# Patient Record
Sex: FEMALE | Race: WHITE | Employment: UNEMPLOYED | URBAN - METROPOLITAN AREA
[De-identification: names, ages, dates, MRNs, and addresses within clinical notes are randomized per-mention and may not be internally consistent; named-entity substitution may affect disease eponyms.]

---

## 2018-01-12 ENCOUNTER — HOSPITAL ENCOUNTER (EMERGENCY)
Facility: HOSPITAL | Age: 38
Discharge: HOME | End: 2018-01-12
Attending: EMERGENCY MEDICINE
Payer: COMMERCIAL

## 2018-01-12 ENCOUNTER — APPOINTMENT (OUTPATIENT)
Dept: ULTRASOUND IMAGING | Facility: HOSPITAL | Age: 38
End: 2018-01-12
Attending: EMERGENCY MEDICINE
Payer: COMMERCIAL

## 2018-01-12 VITALS
TEMPERATURE: 98 F | WEIGHT: 204 LBS | RESPIRATION RATE: 19 BRPM | OXYGEN SATURATION: 100 % | SYSTOLIC BLOOD PRESSURE: 110 MMHG | HEART RATE: 74 BPM | DIASTOLIC BLOOD PRESSURE: 67 MMHG | BODY MASS INDEX: 33.99 KG/M2 | HEIGHT: 65 IN

## 2018-01-12 DIAGNOSIS — O03.4 INEVITABLE ABORTION: ICD-10-CM

## 2018-01-12 DIAGNOSIS — N93.9 VAGINAL BLEEDING: Primary | ICD-10-CM

## 2018-01-12 LAB
ALBUMIN SERPL-MCNC: 3.8 G/DL (ref 3.5–4.8)
ALP LIVER SERPL-CCNC: 55 U/L (ref 37–98)
ALT SERPL-CCNC: 39 U/L (ref 14–54)
ANTIBODY SCREEN: NEGATIVE
AST SERPL-CCNC: 27 U/L (ref 15–41)
ATRIAL RATE: 88 BPM
BASOPHILS # BLD AUTO: 0.08 X10(3) UL (ref 0–0.1)
BASOPHILS NFR BLD AUTO: 0.5 %
BILIRUB SERPL-MCNC: 0.7 MG/DL (ref 0.1–2)
BUN BLD-MCNC: 9 MG/DL (ref 8–20)
CALCIUM BLD-MCNC: 8.9 MG/DL (ref 8.3–10.3)
CHLORIDE: 106 MMOL/L (ref 101–111)
CO2: 21 MMOL/L (ref 22–32)
CREAT BLD-MCNC: 1.1 MG/DL (ref 0.55–1.02)
EOSINOPHIL # BLD AUTO: 0.14 X10(3) UL (ref 0–0.3)
EOSINOPHIL NFR BLD AUTO: 0.9 %
ERYTHROCYTE [DISTWIDTH] IN BLOOD BY AUTOMATED COUNT: 12.6 % (ref 11.5–16)
GLUCOSE BLD-MCNC: 133 MG/DL (ref 70–99)
HCG QUANTITATIVE: ABNORMAL MIU/ML (ref ?–3)
HCT VFR BLD AUTO: 36.2 % (ref 34–50)
HGB BLD-MCNC: 12.7 G/DL (ref 12–16)
IMMATURE GRANULOCYTE COUNT: 0.07 X10(3) UL (ref 0–1)
IMMATURE GRANULOCYTE RATIO %: 0.5 %
LYMPHOCYTES # BLD AUTO: 2.41 X10(3) UL (ref 0.9–4)
LYMPHOCYTES NFR BLD AUTO: 16.1 %
M PROTEIN MFR SERPL ELPH: 7.1 G/DL (ref 6.1–8.3)
MCH RBC QN AUTO: 30.7 PG (ref 27–33.2)
MCHC RBC AUTO-ENTMCNC: 35.1 G/DL (ref 31–37)
MCV RBC AUTO: 87.4 FL (ref 81–100)
MONOCYTES # BLD AUTO: 0.57 X10(3) UL (ref 0.1–0.6)
MONOCYTES NFR BLD AUTO: 3.8 %
NEUTROPHIL ABS PRELIM: 11.72 X10 (3) UL (ref 1.3–6.7)
NEUTROPHILS # BLD AUTO: 11.72 X10(3) UL (ref 1.3–6.7)
NEUTROPHILS NFR BLD AUTO: 78.2 %
P AXIS: 58 DEGREES
P-R INTERVAL: 132 MS
PLATELET # BLD AUTO: 218 10(3)UL (ref 150–450)
POTASSIUM SERPL-SCNC: 3.6 MMOL/L (ref 3.6–5.1)
Q-T INTERVAL: 354 MS
QRS DURATION: 80 MS
QTC CALCULATION (BEZET): 428 MS
R AXIS: 42 DEGREES
RBC # BLD AUTO: 4.14 X10(6)UL (ref 3.8–5.1)
RED CELL DISTRIBUTION WIDTH-SD: 40.4 FL (ref 35.1–46.3)
RH BLOOD TYPE: POSITIVE
SODIUM SERPL-SCNC: 136 MMOL/L (ref 136–144)
T AXIS: 34 DEGREES
VENTRICULAR RATE: 88 BPM
WBC # BLD AUTO: 15 X10(3) UL (ref 4–13)

## 2018-01-12 PROCEDURE — 86901 BLOOD TYPING SEROLOGIC RH(D): CPT

## 2018-01-12 PROCEDURE — 85025 COMPLETE CBC W/AUTO DIFF WBC: CPT | Performed by: EMERGENCY MEDICINE

## 2018-01-12 PROCEDURE — 80053 COMPREHEN METABOLIC PANEL: CPT

## 2018-01-12 PROCEDURE — 76801 OB US < 14 WKS SINGLE FETUS: CPT | Performed by: EMERGENCY MEDICINE

## 2018-01-12 PROCEDURE — 93005 ELECTROCARDIOGRAM TRACING: CPT

## 2018-01-12 PROCEDURE — 86850 RBC ANTIBODY SCREEN: CPT

## 2018-01-12 PROCEDURE — 80053 COMPREHEN METABOLIC PANEL: CPT | Performed by: EMERGENCY MEDICINE

## 2018-01-12 PROCEDURE — 84702 CHORIONIC GONADOTROPIN TEST: CPT

## 2018-01-12 PROCEDURE — 86901 BLOOD TYPING SEROLOGIC RH(D): CPT | Performed by: EMERGENCY MEDICINE

## 2018-01-12 PROCEDURE — 84702 CHORIONIC GONADOTROPIN TEST: CPT | Performed by: EMERGENCY MEDICINE

## 2018-01-12 PROCEDURE — 99285 EMERGENCY DEPT VISIT HI MDM: CPT

## 2018-01-12 PROCEDURE — 85025 COMPLETE CBC W/AUTO DIFF WBC: CPT

## 2018-01-12 PROCEDURE — 96360 HYDRATION IV INFUSION INIT: CPT

## 2018-01-12 PROCEDURE — 86900 BLOOD TYPING SEROLOGIC ABO: CPT

## 2018-01-12 PROCEDURE — 76817 TRANSVAGINAL US OBSTETRIC: CPT | Performed by: EMERGENCY MEDICINE

## 2018-01-12 PROCEDURE — 96361 HYDRATE IV INFUSION ADD-ON: CPT

## 2018-01-12 PROCEDURE — 86850 RBC ANTIBODY SCREEN: CPT | Performed by: EMERGENCY MEDICINE

## 2018-01-12 PROCEDURE — 93010 ELECTROCARDIOGRAM REPORT: CPT

## 2018-01-12 PROCEDURE — 86900 BLOOD TYPING SEROLOGIC ABO: CPT | Performed by: EMERGENCY MEDICINE

## 2018-01-12 RX ORDER — SODIUM CHLORIDE 9 MG/ML
INJECTION, SOLUTION INTRAVENOUS ONCE
Status: COMPLETED | OUTPATIENT
Start: 2018-01-12 | End: 2018-01-12

## 2018-01-12 NOTE — ED NOTES
Pt is resting - 1st bag NS has almost infused. Pt denies pain at this time.  Pt will be transferred back to C8 when clean

## 2018-01-12 NOTE — ED PROVIDER NOTES
Patient Seen in: [facility] Emergency Department    History   Patient presents with:  Syncope (cardiovascular, neurologic)    Stated Complaint: syncope, vaginal bleeding.  medical     HPI    Patient is a 56-year-old female presents the emerge bilaterally without rales/rhonchi. Equal breath sounds bilaterally  Cardiac: No tachycardia. No murmurs. Regular rate and rhythm. Abdomen: Soft and nontender throughout. No rebound or guarding  Extremities: No clubbing/cyanosis/edema.   Abdomen: Mild s [id number]]                                  Final result                 Please view results for these tests on the individual orders.    ABORH (BLOOD TYPE)   ANTIBODY SCREEN   RAINBOW DRAW LAVENDER   RAINBOW DRAW LIGHT GREEN   RAINBOW DRAW BLUE   RA

## 2018-01-12 NOTE — ED INITIAL ASSESSMENT (HPI)
Pt had medical , Pt took pills around 1230AM and began bleeding around 0400AM, PT rpt having 3 syncopal episodes since. Pt also experiencing heavy bleeding.

## 2018-01-13 NOTE — ED PROVIDER NOTES
Us Ob W/ Ev 1st Trimester (SEC=28478/29031)    Result Date: 1/12/2018  PROCEDURE:  US OB W/ EV 1ST TRIMESTER (CPT=76801/96366)  COMPARISON:  None. INDICATIONS:  53-year-old pregnant female presents for heavy vaginal bleeding.   TECHNIQUE:  Transabdominal a

## 2018-03-17 ENCOUNTER — HOSPITAL ENCOUNTER (OUTPATIENT)
Facility: HOSPITAL | Age: 38
Setting detail: OBSERVATION
Discharge: HOME | End: 2018-03-18
Attending: STUDENT IN AN ORGANIZED HEALTH CARE EDUCATION/TRAINING PROGRAM | Admitting: HOSPITALIST
Payer: COMMERCIAL

## 2018-03-17 DIAGNOSIS — O03.4 RETAINED PRODUCTS OF CONCEPTION FOLLOWING ABORTION: Primary | ICD-10-CM

## 2018-03-17 DIAGNOSIS — O03.1: ICD-10-CM

## 2018-03-17 LAB
APTT PPP: 30.2 SECONDS (ref 25–34)
BASOPHILS # BLD AUTO: 0.04 X10(3) UL (ref 0–0.1)
BASOPHILS NFR BLD AUTO: 0.4 %
EOSINOPHIL # BLD AUTO: 0.36 X10(3) UL (ref 0–0.3)
EOSINOPHIL NFR BLD AUTO: 3.9 %
ERYTHROCYTE [DISTWIDTH] IN BLOOD BY AUTOMATED COUNT: 13.2 % (ref 11.5–16)
HCT VFR BLD AUTO: 35.5 % (ref 34–50)
HGB BLD-MCNC: 12.1 G/DL (ref 12–16)
IMMATURE GRANULOCYTE COUNT: 0.02 X10(3) UL (ref 0–1)
IMMATURE GRANULOCYTE RATIO %: 0.2 %
INR BLD: 1.08 (ref 0.9–1.1)
LYMPHOCYTES # BLD AUTO: 2.86 X10(3) UL (ref 0.9–4)
LYMPHOCYTES NFR BLD AUTO: 31.2 %
MCH RBC QN AUTO: 29.9 PG (ref 27–33.2)
MCHC RBC AUTO-ENTMCNC: 34.1 G/DL (ref 31–37)
MCV RBC AUTO: 87.7 FL (ref 81–100)
MONOCYTES # BLD AUTO: 0.68 X10(3) UL (ref 0.1–1)
MONOCYTES NFR BLD AUTO: 7.4 %
NEUTROPHIL ABS PRELIM: 5.21 X10 (3) UL (ref 1.3–6.7)
NEUTROPHILS # BLD AUTO: 5.21 X10(3) UL (ref 1.3–6.7)
NEUTROPHILS NFR BLD AUTO: 56.9 %
PLATELET # BLD AUTO: 206 10(3)UL (ref 150–450)
PSA SERPL DL<=0.01 NG/ML-MCNC: 14.4 SECONDS (ref 12.4–14.7)
RBC # BLD AUTO: 4.05 X10(6)UL (ref 3.8–5.1)
RED CELL DISTRIBUTION WIDTH-SD: 42.5 FL (ref 35.1–46.3)
WBC # BLD AUTO: 9.2 X10(3) UL (ref 4–13)

## 2018-03-18 ENCOUNTER — APPOINTMENT (OUTPATIENT)
Dept: ULTRASOUND IMAGING | Facility: HOSPITAL | Age: 38
End: 2018-03-18
Attending: STUDENT IN AN ORGANIZED HEALTH CARE EDUCATION/TRAINING PROGRAM
Payer: COMMERCIAL

## 2018-03-18 VITALS
HEIGHT: 65 IN | OXYGEN SATURATION: 93 % | BODY MASS INDEX: 33.32 KG/M2 | SYSTOLIC BLOOD PRESSURE: 115 MMHG | DIASTOLIC BLOOD PRESSURE: 62 MMHG | RESPIRATION RATE: 22 BRPM | WEIGHT: 200 LBS | HEART RATE: 61 BPM | TEMPERATURE: 98 F

## 2018-03-18 PROBLEM — O03.4 RETAINED PRODUCTS OF CONCEPTION FOLLOWING ABORTION: Status: ACTIVE | Noted: 2018-03-18

## 2018-03-18 PROBLEM — O03.1 ABORTION, INCOMPLETE WITH HEMORRHAGE: Status: ACTIVE | Noted: 2018-03-18

## 2018-03-18 LAB
ALBUMIN SERPL-MCNC: 3.6 G/DL (ref 3.5–4.8)
ALP LIVER SERPL-CCNC: 57 U/L (ref 37–98)
ALT SERPL-CCNC: 24 U/L (ref 14–54)
ANTIBODY SCREEN: NEGATIVE
AST SERPL-CCNC: 19 U/L (ref 15–41)
BASOPHILS # BLD AUTO: 0.05 X10(3) UL (ref 0–0.1)
BASOPHILS NFR BLD AUTO: 0.6 %
BILIRUB SERPL-MCNC: 0.3 MG/DL (ref 0.1–2)
BUN BLD-MCNC: 12 MG/DL (ref 8–20)
BUN BLD-MCNC: 13 MG/DL (ref 8–20)
CALCIUM BLD-MCNC: 8 MG/DL (ref 8.3–10.3)
CALCIUM BLD-MCNC: 8.6 MG/DL (ref 8.3–10.3)
CHLORIDE: 108 MMOL/L (ref 101–111)
CHLORIDE: 110 MMOL/L (ref 101–111)
CO2: 25 MMOL/L (ref 22–32)
CO2: 25 MMOL/L (ref 22–32)
CREAT BLD-MCNC: 0.81 MG/DL (ref 0.55–1.02)
CREAT BLD-MCNC: 1.08 MG/DL (ref 0.55–1.02)
EOSINOPHIL # BLD AUTO: 0.37 X10(3) UL (ref 0–0.3)
EOSINOPHIL NFR BLD AUTO: 4.4 %
ERYTHROCYTE [DISTWIDTH] IN BLOOD BY AUTOMATED COUNT: 13.2 % (ref 11.5–16)
GLUCOSE BLD-MCNC: 96 MG/DL (ref 70–99)
GLUCOSE BLD-MCNC: 98 MG/DL (ref 70–99)
HAV IGM SER QL: 2 MG/DL (ref 1.7–3)
HCG QUANTITATIVE: 8 MIU/ML (ref ?–3)
HCT VFR BLD AUTO: 31.8 % (ref 34–50)
HGB BLD-MCNC: 10.7 G/DL (ref 12–16)
IMMATURE GRANULOCYTE COUNT: 0.02 X10(3) UL (ref 0–1)
IMMATURE GRANULOCYTE RATIO %: 0.2 %
LYMPHOCYTES # BLD AUTO: 2.44 X10(3) UL (ref 0.9–4)
LYMPHOCYTES NFR BLD AUTO: 28.9 %
M PROTEIN MFR SERPL ELPH: 7 G/DL (ref 6.1–8.3)
MCH RBC QN AUTO: 29.8 PG (ref 27–33.2)
MCHC RBC AUTO-ENTMCNC: 33.6 G/DL (ref 31–37)
MCV RBC AUTO: 88.6 FL (ref 81–100)
MONOCYTES # BLD AUTO: 0.69 X10(3) UL (ref 0.1–1)
MONOCYTES NFR BLD AUTO: 8.2 %
NEUTROPHIL ABS PRELIM: 4.88 X10 (3) UL (ref 1.3–6.7)
NEUTROPHILS # BLD AUTO: 4.88 X10(3) UL (ref 1.3–6.7)
NEUTROPHILS NFR BLD AUTO: 57.7 %
PLATELET # BLD AUTO: 182 10(3)UL (ref 150–450)
POTASSIUM SERPL-SCNC: 3.5 MMOL/L (ref 3.6–5.1)
POTASSIUM SERPL-SCNC: 3.6 MMOL/L (ref 3.6–5.1)
RBC # BLD AUTO: 3.59 X10(6)UL (ref 3.8–5.1)
RED CELL DISTRIBUTION WIDTH-SD: 42.8 FL (ref 35.1–46.3)
RH BLOOD TYPE: POSITIVE
SODIUM SERPL-SCNC: 138 MMOL/L (ref 136–144)
SODIUM SERPL-SCNC: 139 MMOL/L (ref 136–144)
WBC # BLD AUTO: 8.5 X10(3) UL (ref 4–13)

## 2018-03-18 PROCEDURE — 99223 1ST HOSP IP/OBS HIGH 75: CPT | Performed by: HOSPITALIST

## 2018-03-18 PROCEDURE — 93975 VASCULAR STUDY: CPT | Performed by: STUDENT IN AN ORGANIZED HEALTH CARE EDUCATION/TRAINING PROGRAM

## 2018-03-18 PROCEDURE — 76856 US EXAM PELVIC COMPLETE: CPT | Performed by: STUDENT IN AN ORGANIZED HEALTH CARE EDUCATION/TRAINING PROGRAM

## 2018-03-18 PROCEDURE — 76830 TRANSVAGINAL US NON-OB: CPT | Performed by: STUDENT IN AN ORGANIZED HEALTH CARE EDUCATION/TRAINING PROGRAM

## 2018-03-18 RX ORDER — MORPHINE SULFATE 4 MG/ML
1 INJECTION, SOLUTION INTRAMUSCULAR; INTRAVENOUS EVERY 2 HOUR PRN
Status: DISCONTINUED | OUTPATIENT
Start: 2018-03-18 | End: 2018-03-18

## 2018-03-18 RX ORDER — MORPHINE SULFATE 4 MG/ML
2 INJECTION, SOLUTION INTRAMUSCULAR; INTRAVENOUS EVERY 2 HOUR PRN
Status: DISCONTINUED | OUTPATIENT
Start: 2018-03-18 | End: 2018-03-18

## 2018-03-18 RX ORDER — ONDANSETRON 2 MG/ML
4 INJECTION INTRAMUSCULAR; INTRAVENOUS EVERY 6 HOURS PRN
Status: DISCONTINUED | OUTPATIENT
Start: 2018-03-18 | End: 2018-03-18

## 2018-03-18 RX ORDER — SODIUM CHLORIDE 9 MG/ML
INJECTION, SOLUTION INTRAVENOUS CONTINUOUS
Status: CANCELLED | OUTPATIENT
Start: 2018-03-18 | End: 2018-03-18

## 2018-03-18 RX ORDER — MORPHINE SULFATE 4 MG/ML
4 INJECTION, SOLUTION INTRAMUSCULAR; INTRAVENOUS EVERY 2 HOUR PRN
Status: DISCONTINUED | OUTPATIENT
Start: 2018-03-18 | End: 2018-03-18

## 2018-03-18 RX ORDER — SODIUM CHLORIDE 9 MG/ML
INJECTION, SOLUTION INTRAVENOUS CONTINUOUS
Status: DISCONTINUED | OUTPATIENT
Start: 2018-03-18 | End: 2018-03-18

## 2018-03-18 NOTE — H&P
History & Physical Examination    Patient Name: Dany Westbrook  MRN: [id number]  CSN: [id number]  YOB: 1980    Diagnosis: Heavy bleeding. Present Illness: 12IR [id number] s/p Induced ELAB by Methotrexate?  On 1/13/18 presents with heavy bleed

## 2018-03-18 NOTE — PROGRESS NOTES
Admitted after midnight. Stopped by for social visit, but patient sleeping soundly.   Tentative plan for D&C today at 862 Sam Valero MD  [facility]  Internal Medicine Hospitalist  Pager [phone]

## 2018-03-18 NOTE — PLAN OF CARE
Patient/Family Goals    • Patient/Family Long Term Goal Adequate for Discharge    • Patient/Family Short Term Goal Adequate for Discharge        NO RESPIRATORY DIFFICULTY NOTED, ABD SOFT, TAKES REGULAR DIET THIS EVENING WITHOUT NAUSEA, MINIMAL VAG DRNG NOT

## 2018-03-18 NOTE — PROGRESS NOTES
Pt admitted to room 320 with vaginal bleeding, plan for D&C around 1100 today. Pt a/ox4, VSS, ambulated in room without difficulty. Pt denies dizziness. Pt does state she had 3 episodes of fainting after .  Pt instructed to get up with standby pedrito

## 2018-03-18 NOTE — ED PROVIDER NOTES
Patient Seen in: [facility] Emergency Department    History   Patient presents with:  Eval-G (gynecologic)    Stated Complaint: eval g    HPI    Patient is a 28-year-old female who presents emergency department reporting heavy vaginal bleeding for t rate, regular rhythm, normal heart sounds and intact distal pulses. Exam reveals no gallop and no friction rub. No murmur heard. Pulmonary/Chest: Effort normal. No respiratory distress. no wheezes. no rales. no tenderness. Abdominal: Soft.  Bowel so ---------                               -----------         ------                     82 Amanda Coyle (BLOOD [facility])[[id number]]                               Final result               ANTIBODY [id number]]                                  Final resul

## 2018-03-18 NOTE — ED NOTES
Report received from [facility] pt resting, pt updated with ED process, awaiting US report.

## 2018-03-18 NOTE — PROGRESS NOTES
DISCHARGED TO HOME PER WHEELCHAIR AND ACCOMPANIED BY FAMILY, DISCHARGED WITH INSTRUCTIONS AND INFORMATION FOR MOHAN VELEZ, PT VERBALIZES UNDERSTANDING OF ALL INSTRUCTIONS

## 2018-03-18 NOTE — H&P
[facility] HOSPITALIST  History and Physical     Kaylyn Valdez Patient Status:  Emergency    1980 MRN [id number]   [address] Attending Em Matute MD   Hosp Day # 0 PCP CHERI MAYEN     Chief Complaint: Ulysses Severo Moves all extremities. Extremities: No edema or cyanosis. Integument: No rashes or lesions. Psychiatric: Appropriate mood and affect.     Diagnostic Data:      Labs:  Recent Labs   Lab  03/17/18   2332   WBC  9.2   HGB  12.1   MCV  87.7   PLT  206.0   I

## 2018-03-18 NOTE — PROGRESS NOTES
DR Uvaldo Miller HERE AND DOES VAG EXAM AND REMOVED SMALL AMOUNT OF DK RED CLOTS PER VAGINA AND NO NEW DRAINAGE NOTED, PT SHAWN WELL,

## 2018-03-18 NOTE — PROGRESS NOTES
PT UP TO BATHROOM WITH STEADY GAIT, VOIDS WITHOUT DIFFICULTY, MODERATE AMOUNT OF RED VAG DRNG NOTED, PERIPAD CHANGED

## 2018-03-18 NOTE — ED INITIAL ASSESSMENT (HPI)
Pt c/o golf-ball sized clots, and heavy bleeding x2 hours and also irregular periods after having elective  2 months ago - did us at that time and was told all products had passed.      Reports this period started w/ a large gush of blood

## 2018-03-18 NOTE — DISCHARGE SUMMARY
[facility] HOSPITALIST  DISCHARGE SUMMARY     Juan Vargas Patient Status:  Inpatient    1980 MRN [id number]   [facility] 3NW-A Attending Serina Pearson MD   Hosp Day # 1 CASTILLO Gomez     Date of Admission: 3/17/2018  Date of Disch (brief descriptions):  • none    Lab/Test results pending at Discharge:   · none    Consultants:  • gyn    Discharge Medication List:     Discharge Medications      You have not been prescribed any medications.          [place]    Follow-up appoi

## 2018-03-19 NOTE — CONSULTS
[facility]    PATIENT'S NAME: SEVERINO Parsons   ATTENDING PHYSICIAN: TONY [facility]: Jasmyn Dhillon M.D.    PATIENT ACCOUNT#:   [de-identified]    [facility]  MEDICAL RECORD #:   [id number]       DATE OF JUAN C spontaneous AB prior to her first baby; it sounds like it was a miscarriage. GYNECOLOGIC HISTORY:  15/every month/4-5 days. No history of sexually transmitted diseases.   She says that she had a couple of abnormal Paps, but then she said it was some typ which is tomorrow, her usual GYN.   I did have Halina Cheadle, her nurse, give her information on the different types of IUDs including Ladena Denys, and Paragard IUD for future birth control, but for now, I am going to go ahead and start the patient on a progeste

## (undated) NOTE — ED AVS SNAPSHOT
Yamileth Tolbert   MRN: [id number]    Department:  [facility] Emergency Department   Date of Visit:  1/12/2018           Disclosure     Insurance plans vary and the physician(s) referred by the ER may not be covered by your plan.  Please contact you tell this physician (or your personal doctor if your instructions are to return to your personal doctor) about any new or lasting problems. The primary care or specialist physician will see patients referred from the [facility] Emergency Department.  Yomi Nguyen